# Patient Record
Sex: FEMALE | Race: WHITE | NOT HISPANIC OR LATINO
[De-identification: names, ages, dates, MRNs, and addresses within clinical notes are randomized per-mention and may not be internally consistent; named-entity substitution may affect disease eponyms.]

---
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
## 2020-03-13 ENCOUNTER — APPOINTMENT (OUTPATIENT)
Dept: PEDIATRICS | Facility: CLINIC | Age: 20
End: 2020-03-13
Payer: MEDICAID

## 2020-03-13 VITALS — WEIGHT: 167.5 LBS | TEMPERATURE: 96.8 F | DIASTOLIC BLOOD PRESSURE: 78 MMHG | SYSTOLIC BLOOD PRESSURE: 120 MMHG

## 2020-03-13 PROCEDURE — 99214 OFFICE O/P EST MOD 30 MIN: CPT

## 2020-03-13 NOTE — HISTORY OF PRESENT ILLNESS
[de-identified] : itchy red rash on both underarms for 1 month. white spots on both palms for 2 days. [FreeTextEntry6] : + rash under axilla X 1months; tried hydrocortisone cream which did not help, also now bumpy rash on hands, + itchy; uncertain what type of deodorant she is using; dove sensitive soap; aveeno lotion; no fevers; no other rashes no other new products\par meds: OCP

## 2020-03-13 NOTE — PHYSICAL EXAM
[NL] : regular rate and rhythm, normal S1, S2 audible, no murmurs [de-identified] : dry skin to hands, + papules to palms and medial fingers; + papules and dry atopic patches to axilla b/l

## 2020-03-13 NOTE — DISCUSSION/SUMMARY
[FreeTextEntry1] : D/W pt dyshidrotic eczema/ atopic dermatitis; reviewed advise lotions/soaps and detergents without scent or dye; apply Aquaphor or Eucerin head to toe after bath time; topical steroids as below to active patches only; monitor and call if further concern for recheck.\par f/u with dermatology in 2days as planned\par

## 2020-06-02 ENCOUNTER — APPOINTMENT (OUTPATIENT)
Dept: PEDIATRICS | Facility: CLINIC | Age: 20
End: 2020-06-02
Payer: MEDICAID

## 2020-06-02 VITALS — SYSTOLIC BLOOD PRESSURE: 110 MMHG | DIASTOLIC BLOOD PRESSURE: 78 MMHG

## 2020-06-02 VITALS
WEIGHT: 163.3 LBS | HEIGHT: 63.75 IN | BODY MASS INDEX: 28.22 KG/M2 | DIASTOLIC BLOOD PRESSURE: 82 MMHG | HEART RATE: 94 BPM | SYSTOLIC BLOOD PRESSURE: 128 MMHG

## 2020-06-02 DIAGNOSIS — R63.2 POLYPHAGIA: ICD-10-CM

## 2020-06-02 DIAGNOSIS — Z86.19 PERSONAL HISTORY OF OTHER INFECTIOUS AND PARASITIC DISEASES: ICD-10-CM

## 2020-06-02 DIAGNOSIS — Z87.01 PERSONAL HISTORY OF PNEUMONIA (RECURRENT): ICD-10-CM

## 2020-06-02 DIAGNOSIS — Z87.898 PERSONAL HISTORY OF OTHER SPECIFIED CONDITIONS: ICD-10-CM

## 2020-06-02 DIAGNOSIS — L23.9 ALLERGIC CONTACT DERMATITIS, UNSPECIFIED CAUSE: ICD-10-CM

## 2020-06-02 PROCEDURE — 99395 PREV VISIT EST AGE 18-39: CPT | Mod: 25

## 2020-06-02 PROCEDURE — 90471 IMMUNIZATION ADMIN: CPT

## 2020-06-02 PROCEDURE — 96127 BRIEF EMOTIONAL/BEHAV ASSMT: CPT

## 2020-06-02 PROCEDURE — 90651 9VHPV VACCINE 2/3 DOSE IM: CPT

## 2020-06-02 PROCEDURE — 96160 PT-FOCUSED HLTH RISK ASSMT: CPT | Mod: 59

## 2020-06-02 RX ORDER — LEVONORGESTREL AND ETHINYL ESTRADIOL 0.1-0.02MG
0.1-2 KIT ORAL DAILY
Qty: 90 | Refills: 0 | Status: DISCONTINUED | COMMUNITY
Start: 2019-07-24 | End: 2020-06-02

## 2020-06-02 NOTE — HISTORY OF PRESENT ILLNESS
[Up to date] : Up to date [Is permitted and is able to make independent decisions] : Is permitted and is able to make independent decisions [Has friends] : has friends [At least 1 hour of physical activity a day] : does not do at least 1 hour of physical activity a day [Sleep Concerns] : no sleep concerns [Uses tobacco] : does not use tobacco [Uses electronic nicotine delivery system] : does not use electronic nicotine delivery system [Uses drugs] : does not use drugs  [Drinks alcohol] : does not drink alcohol [Yes] : Patient has had sexual intercourse. [No] : No cigarette smoke exposure [FreeTextEntry8] : no menses due to OCP [FreeTextEntry7] : 19 year [facility] [de-identified] : sister [de-identified] : did well this year in college [de-identified] : under care of GYN [de-identified] : n eeds more activity/exercise [de-identified] : eats well  [de-identified] : patient has anxiety- feels like she  has OCD

## 2020-06-02 NOTE — RISK ASSESSMENT
[1] : 2) Feeling down, depressed, or hopeless for several days (1) [FreeTextEntry1] : patient acknowledges stress/ anxiety

## 2020-06-02 NOTE — PHYSICAL EXAM
[No Acute Distress] : no acute distress [Alert] : alert [Normocephalic] : normocephalic [Clear tympanic membranes with bony landmarks and light reflex present bilaterally] : clear tympanic membranes with bony landmarks and light reflex present bilaterally  [EOMI Bilateral] : EOMI bilateral [Nonerythematous Oropharynx] : nonerythematous oropharynx [Supple, full passive range of motion] : supple, full passive range of motion [Pink Nasal Mucosa] : pink nasal mucosa [Regular Rate and Rhythm] : regular rate and rhythm [Normal S1, S2 audible] : normal S1, S2 audible [No Palpable Masses] : no palpable masses [Clear to Auscultation Bilaterally] : clear to auscultation bilaterally [No Murmurs] : no murmurs [Soft] : soft [NonTender] : non tender [Non Distended] : non distended [No Splenomegaly] : no splenomegaly [Loe: _____] : Ole [unfilled] [No Hepatomegaly] : no hepatomegaly [Normal Muscle Tone] : normal muscle tone [No Gait Asymmetry] : no gait asymmetry [No Abnormal Lymph Nodes Palpated] : no abnormal lymph nodes palpated [No pain or deformities with palpation of bone, muscles, joints] : no pain or deformities with palpation of bone, muscles, joints [Straight] : straight [Cranial Nerves Grossly Intact] : cranial nerves grossly intact [No Rash or Lesions] : no rash or lesions

## 2020-06-12 ENCOUNTER — APPOINTMENT (OUTPATIENT)
Dept: OBGYN | Facility: CLINIC | Age: 20
End: 2020-06-12
Payer: MEDICAID

## 2020-06-12 VITALS
WEIGHT: 160 LBS | HEIGHT: 63.5 IN | SYSTOLIC BLOOD PRESSURE: 102 MMHG | DIASTOLIC BLOOD PRESSURE: 60 MMHG | BODY MASS INDEX: 28 KG/M2

## 2020-06-12 LAB
HCG UR QL: NEGATIVE
QUALITY CONTROL: YES

## 2020-06-12 PROCEDURE — 99213 OFFICE O/P EST LOW 20 MIN: CPT

## 2020-06-12 PROCEDURE — 81025 URINE PREGNANCY TEST: CPT

## 2020-06-12 NOTE — COUNSELING
[Breast Self Exam] : breast self exam [Vitamins/Supplements] : vitamins/supplements [Nutrition] : nutrition [Exercise] : exercise [Contraception] : contraception [Medication Management] : medication management [Weight Management] : weight management

## 2020-06-26 LAB
A VAGINAE DNA VAG QL NAA+PROBE: NORMAL
BVAB2 DNA VAG QL NAA+PROBE: NORMAL
C KRUSEI DNA VAG QL NAA+PROBE: NEGATIVE
C KRUSEI DNA VAG QL NAA+PROBE: POSITIVE
C TRACH RRNA SPEC QL NAA+PROBE: NEGATIVE
MEGA1 DNA VAG QL NAA+PROBE: NORMAL
N GONORRHOEA RRNA SPEC QL NAA+PROBE: NEGATIVE
T VAGINALIS RRNA SPEC QL NAA+PROBE: NEGATIVE

## 2020-07-24 ENCOUNTER — APPOINTMENT (OUTPATIENT)
Dept: OBGYN | Facility: CLINIC | Age: 20
End: 2020-07-24

## 2020-07-27 ENCOUNTER — APPOINTMENT (OUTPATIENT)
Dept: OBGYN | Facility: CLINIC | Age: 20
End: 2020-07-27
Payer: MEDICAID

## 2020-07-27 VITALS
SYSTOLIC BLOOD PRESSURE: 128 MMHG | BODY MASS INDEX: 29.36 KG/M2 | WEIGHT: 167.77 LBS | RESPIRATION RATE: 16 BRPM | HEIGHT: 63.5 IN | DIASTOLIC BLOOD PRESSURE: 80 MMHG

## 2020-07-27 PROCEDURE — 81025 URINE PREGNANCY TEST: CPT

## 2020-07-27 PROCEDURE — 99395 PREV VISIT EST AGE 18-39: CPT

## 2020-07-27 NOTE — COUNSELING
[Nutrition] : nutrition [Breast Self Exam] : breast self exam [STD (testing, results, tx)] : STD (testing, results, tx) [Exercise] : exercise [Vitamins/Supplements] : vitamins/supplements [Contraception] : contraception [Medication Management] : medication management [Weight Management] : weight management

## 2020-07-28 LAB
C TRACH RRNA SPEC QL NAA+PROBE: NOT DETECTED
N GONORRHOEA RRNA SPEC QL NAA+PROBE: NOT DETECTED
SOURCE TP AMPLIFICATION: NORMAL

## 2020-08-07 ENCOUNTER — APPOINTMENT (OUTPATIENT)
Dept: PEDIATRICS | Facility: CLINIC | Age: 20
End: 2020-08-07
Payer: MEDICAID

## 2020-08-07 VITALS — TEMPERATURE: 97.2 F | WEIGHT: 168.8 LBS

## 2020-08-07 LAB
BILIRUB UR QL STRIP: NEGATIVE
CLARITY UR: NORMAL
COLLECTION METHOD: NORMAL
GLUCOSE UR-MCNC: NORMAL
HCG UR QL: 0.2 EU/DL
HGB UR QL STRIP.AUTO: NORMAL
KETONES UR-MCNC: NEGATIVE
LEUKOCYTE ESTERASE UR QL STRIP: NORMAL
NITRITE UR QL STRIP: POSITIVE
PH UR STRIP: 5.5
PROT UR STRIP-MCNC: 30
SP GR UR STRIP: 1.03

## 2020-08-07 PROCEDURE — 99214 OFFICE O/P EST MOD 30 MIN: CPT | Mod: 25

## 2020-08-07 PROCEDURE — 81003 URINALYSIS AUTO W/O SCOPE: CPT | Mod: QW

## 2020-08-07 NOTE — DISCUSSION/SUMMARY
[FreeTextEntry1] : 19y F seen for dysuria and suprapubic pain x several days.\par UA + blood (menstruating), 1+ protein, positive for nitrites and trace leuk esterase.\par Will treat for UTI- Duricef 1gram PO QD x 10 days.\par Pt will drop off urine culture 4 days after starting antibiotics.\par Will call pt with Ucx results.\par

## 2020-08-07 NOTE — HISTORY OF PRESENT ILLNESS
[de-identified] : frequent urgency to urinate with little output, burning sensation while urinating x 2 days [FreeTextEntry6] : urinary frequency, burning at end of urination, and suprapubic pain x 2 days.\par Afebrile. Eating/drinking/energy level normal. \par Sexually active with one male partner. On OCPs. No condom use. \par Saw GYN two weeks ago - STI screenings were negative.\par

## 2020-08-11 ENCOUNTER — RESULT CHARGE (OUTPATIENT)
Age: 20
End: 2020-08-11

## 2020-08-11 LAB
BILIRUB UR QL STRIP: NORMAL
GLUCOSE UR-MCNC: NORMAL
HCG UR QL: 0.2 EU/DL
HGB UR QL STRIP.AUTO: NORMAL
KETONES UR-MCNC: NORMAL
LEUKOCYTE ESTERASE UR QL STRIP: NORMAL
NITRITE UR QL STRIP: NORMAL
PH UR STRIP: 6
PROT UR STRIP-MCNC: NORMAL
SP GR UR STRIP: 1.02

## 2020-08-20 ENCOUNTER — APPOINTMENT (OUTPATIENT)
Dept: PEDIATRICS | Facility: CLINIC | Age: 20
End: 2020-08-20
Payer: MEDICAID

## 2020-08-20 VITALS — WEIGHT: 171.6 LBS | TEMPERATURE: 97 F

## 2020-08-20 DIAGNOSIS — B97.11 COXSACKIEVIRUS AS THE CAUSE OF DISEASES CLASSIFIED ELSEWHERE: ICD-10-CM

## 2020-08-20 DIAGNOSIS — R39.9 UNSPECIFIED SYMPTOMS AND SIGNS INVOLVING THE GENITOURINARY SYSTEM: ICD-10-CM

## 2020-08-20 DIAGNOSIS — Z30.019 ENCOUNTER FOR INITIAL PRESCRIPTION OF CONTRACEPTIVES, UNSPECIFIED: ICD-10-CM

## 2020-08-20 DIAGNOSIS — B37.9 CANDIDIASIS, UNSPECIFIED: ICD-10-CM

## 2020-08-20 DIAGNOSIS — Z87.898 PERSONAL HISTORY OF OTHER SPECIFIED CONDITIONS: ICD-10-CM

## 2020-08-20 DIAGNOSIS — Z30.41 ENCOUNTER FOR SURVEILLANCE OF CONTRACEPTIVE PILLS: ICD-10-CM

## 2020-08-20 DIAGNOSIS — N76.0 ACUTE VAGINITIS: ICD-10-CM

## 2020-08-20 DIAGNOSIS — B96.89 ACUTE VAGINITIS: ICD-10-CM

## 2020-08-20 PROCEDURE — 99213 OFFICE O/P EST LOW 20 MIN: CPT

## 2020-08-20 RX ORDER — FLUCONAZOLE 150 MG/1
150 TABLET ORAL
Qty: 1 | Refills: 0 | Status: COMPLETED | COMMUNITY
Start: 2020-06-26 | End: 2020-08-20

## 2020-08-20 RX ORDER — NORETHINDRONE ACETATE AND ETHINYL ESTRADIOL 1; .02 MG/1; MG/1
1-20 TABLET ORAL DAILY
Qty: 21 | Refills: 0 | Status: COMPLETED | COMMUNITY
Start: 2019-10-15 | End: 2020-08-20

## 2020-08-20 RX ORDER — NORETHINDRONE ACETATE AND ETHINYL ESTRADIOL 1; 20 MG/1; UG/1
1-20 TABLET ORAL DAILY
Qty: 90 | Refills: 1 | Status: COMPLETED | COMMUNITY
Start: 2020-06-12 | End: 2020-08-20

## 2020-08-20 RX ORDER — NORETHINDRONE ACETATE AND ETHINYL ESTRADIOL 1; .02 MG/1; MG/1
1-20 TABLET ORAL
Refills: 0 | Status: COMPLETED | COMMUNITY
End: 2020-08-20

## 2020-08-20 RX ORDER — HYDROCORTISONE 25 MG/G
2.5 OINTMENT TOPICAL 4 TIMES DAILY
Qty: 30 | Refills: 2 | Status: ACTIVE | COMMUNITY
Start: 2020-02-03 | End: 1900-01-01

## 2020-08-20 RX ORDER — METRONIDAZOLE 7.5 MG/G
0.75 GEL VAGINAL
Qty: 1 | Refills: 0 | Status: COMPLETED | COMMUNITY
Start: 2019-07-25 | End: 2020-08-20

## 2020-08-20 NOTE — DISCUSSION/SUMMARY
[FreeTextEntry1] : - Discussed natural history and manifestations of coxsackie\par - Discussed supportive care\par - Maintain adequate hydration\par - Discussed infection control\par

## 2020-08-20 NOTE — PHYSICAL EXAM
[Erythematous Oropharynx] : erythematous oropharynx [NL] : normotonic [Erythematous] : erythematous [Macules] : macules [Papules] : papules [Trunk] : trunk [Arms] : arms [Hands] : hands [Legs] : legs [Feet] : feet [de-identified] : s

## 2020-08-20 NOTE — HISTORY OF PRESENT ILLNESS
[de-identified] : itchy rash all over body, started yesterday, no fevers, no changes in foods or soaps [FreeTextEntry6] : - Rash, started yesterday\par - Red itchy spots on arms and legs\par - Red spots on palms and soles, painful to walk\par - Using benadryl, hydrocortisone\par - No new exposures noted including foods, medications, soaps, lotions, detergents, cosmetics\par - No fever\par - No nasal congestion\par - No earache/ear tugging\par - No sore throat  \par - No cough\par - No wheezing or stridor\par - Normal appetite\par - No vomiting\par - Loose stools\par - No sick contacts\par - No recent travel\par

## 2020-08-22 RX ORDER — CEFADROXIL 1000 MG/1
1 TABLET ORAL DAILY
Qty: 10 | Refills: 0 | Status: COMPLETED | COMMUNITY
Start: 2020-08-07 | End: 2020-09-01

## 2020-12-30 ENCOUNTER — TRANSCRIPTION ENCOUNTER (OUTPATIENT)
Age: 20
End: 2020-12-30

## 2021-01-05 ENCOUNTER — TRANSCRIPTION ENCOUNTER (OUTPATIENT)
Age: 21
End: 2021-01-05

## 2021-01-20 ENCOUNTER — APPOINTMENT (OUTPATIENT)
Dept: OBGYN | Facility: CLINIC | Age: 21
End: 2021-01-20
Payer: MEDICAID

## 2021-01-20 VITALS
BODY MASS INDEX: 28.87 KG/M2 | HEART RATE: 72 BPM | SYSTOLIC BLOOD PRESSURE: 116 MMHG | RESPIRATION RATE: 16 BRPM | WEIGHT: 165 LBS | HEIGHT: 63.5 IN | DIASTOLIC BLOOD PRESSURE: 70 MMHG

## 2021-01-20 LAB
HCG UR QL: NEGATIVE
QUALITY CONTROL: YES

## 2021-01-20 PROCEDURE — 99213 OFFICE O/P EST LOW 20 MIN: CPT

## 2021-01-20 PROCEDURE — 81025 URINE PREGNANCY TEST: CPT

## 2021-01-20 PROCEDURE — 99072 ADDL SUPL MATRL&STAF TM PHE: CPT

## 2021-01-20 RX ORDER — CLOTRIMAZOLE 10 MG/G
1 CREAM TOPICAL TWICE DAILY
Qty: 2 | Refills: 0 | Status: ACTIVE | COMMUNITY
Start: 2021-01-20 | End: 1900-01-01

## 2021-01-20 NOTE — DISCUSSION/SUMMARY
[FreeTextEntry1] : 19 YO PRESENTS FOR OCP RENEWAL. NO COMPLAINTS OR CONCERNS AT THIS TIME. \par UCG- NEGATIVE \par \par CBE WNL, SBE TEACHING CONDUCTED WITH RETURN DEMONSTRATION\par PE EXAM REVEALS A SEBACEOUS CYST ALONG WITH VULVAR IRRITATION \par PT ADVISED OF WARM COMPRESS TO AREA FOR SEBACEOUS CYST ALONG WITH CREAM FOR VULVAR ITCHING \par \par PATIENT DECLINES STD TESTING AT THIS TIME \par PATIENT DENIES HX OF MIGRAINES WITH AURAS, PE, DVT OR CIGARETTE SMOKING\par \par ACHES DISCUSSED IN GREAT DETAIL, PATIENT STATES UNDERSTANDING.\par OCP JUNEL RX SENT TO PHARMACY WITH INSTRUCTIONS ON USE \par RX CREAM SENT TO PHARMACY FOR VULVAR ITCHING WITH INSTRUCTIONS ON USE \par ALL QUESTIONS ANSWERED TO PATIENT SATISFACTION \par \par RTO IN 6 MOS FOR ANNUAL OR PRN \par

## 2021-01-20 NOTE — PHYSICAL EXAM
[Alert] : alert [No Acute Distress] : no acute distress [No Murmurs] : no murmurs [Oriented x3] : oriented x3 [Examination Of The Breasts] : a normal appearance [No Masses] : no breast masses were palpable [Labia Majora] : normal [Labia Minora] : normal [Normal] : normal [Uterine Adnexae] : normal

## 2021-01-20 NOTE — COUNSELING
[Nutrition/ Exercise/ Weight Management] : nutrition, exercise, weight management [Body Image] : body image [Breast Self Exam] : breast self exam [Contraception/ Emergency Contraception/ Safe Sexual Practices] : contraception, emergency contraception, safe sexual practices [Medication Management] : medication management

## 2021-02-16 ENCOUNTER — NON-APPOINTMENT (OUTPATIENT)
Age: 21
End: 2021-02-16

## 2021-02-26 ENCOUNTER — TRANSCRIPTION ENCOUNTER (OUTPATIENT)
Age: 21
End: 2021-02-26

## 2021-03-01 ENCOUNTER — TRANSCRIPTION ENCOUNTER (OUTPATIENT)
Age: 21
End: 2021-03-01

## 2021-03-02 ENCOUNTER — NON-APPOINTMENT (OUTPATIENT)
Age: 21
End: 2021-03-02

## 2021-03-04 ENCOUNTER — TRANSCRIPTION ENCOUNTER (OUTPATIENT)
Age: 21
End: 2021-03-04

## 2021-03-10 ENCOUNTER — TRANSCRIPTION ENCOUNTER (OUTPATIENT)
Age: 21
End: 2021-03-10

## 2021-05-09 ENCOUNTER — TRANSCRIPTION ENCOUNTER (OUTPATIENT)
Age: 21
End: 2021-05-09

## 2021-05-13 ENCOUNTER — RX RENEWAL (OUTPATIENT)
Age: 21
End: 2021-05-13

## 2021-05-24 DIAGNOSIS — Z23 ENCOUNTER FOR IMMUNIZATION: ICD-10-CM

## 2021-06-07 ENCOUNTER — APPOINTMENT (OUTPATIENT)
Dept: PEDIATRICS | Facility: CLINIC | Age: 21
End: 2021-06-07
Payer: MEDICAID

## 2021-06-07 VITALS
HEIGHT: 64 IN | SYSTOLIC BLOOD PRESSURE: 116 MMHG | DIASTOLIC BLOOD PRESSURE: 72 MMHG | BODY MASS INDEX: 28 KG/M2 | WEIGHT: 164 LBS

## 2021-06-07 DIAGNOSIS — M54.2 CERVICALGIA: ICD-10-CM

## 2021-06-07 DIAGNOSIS — L70.8 OTHER ACNE: ICD-10-CM

## 2021-06-07 DIAGNOSIS — L29.2 PRURITUS VULVAE: ICD-10-CM

## 2021-06-07 DIAGNOSIS — Z87.2 PERSONAL HISTORY OF DISEASES OF THE SKIN AND SUBCUTANEOUS TISSUE: ICD-10-CM

## 2021-06-07 DIAGNOSIS — R45.86 EMOTIONAL LABILITY: ICD-10-CM

## 2021-06-07 PROCEDURE — 90620 MENB-4C VACCINE IM: CPT

## 2021-06-07 PROCEDURE — 99173 VISUAL ACUITY SCREEN: CPT | Mod: 59

## 2021-06-07 PROCEDURE — 90472 IMMUNIZATION ADMIN EACH ADD: CPT

## 2021-06-07 PROCEDURE — 99395 PREV VISIT EST AGE 18-39: CPT | Mod: 25

## 2021-06-07 PROCEDURE — 90715 TDAP VACCINE 7 YRS/> IM: CPT

## 2021-06-07 PROCEDURE — 90471 IMMUNIZATION ADMIN: CPT

## 2021-06-07 PROCEDURE — 96160 PT-FOCUSED HLTH RISK ASSMT: CPT | Mod: 59

## 2021-06-07 PROCEDURE — 90651 9VHPV VACCINE 2/3 DOSE IM: CPT

## 2021-06-07 NOTE — HISTORY OF PRESENT ILLNESS
[Mother] : mother [Needs Immunizations] : needs immunizations [Normal] : normal [Eats meals with family] : eats meals with family [Sleep Concerns] : no sleep concerns [Normal Performance] : normal performance [Eats regular meals including adequate fruits and vegetables] : eats regular meals including adequate fruits and vegetables [Drinks non-sweetened liquids] : drinks non-sweetened liquids  [Calcium source] : calcium source [At least 1 hour of physical activity a day] : at least 1 hour of physical activity a day [Uses electronic nicotine delivery system] : does not use electronic nicotine delivery system [Uses tobacco] : does not use tobacco [Uses drugs] : does not use drugs  [Drinks alcohol] : does not drink alcohol [No] : No cigarette smoke exposure [Yes] : Patient has had sexual intercourse. [Has problems with sleep] : does not have problems with sleep [Gets depressed, anxious, or irritable/has mood swings] : does not get depressed, anxious, or irritable/has mood swings [Has thought about hurting self or considered suicide] : has not thought about hurting self or considered suicide [FreeTextEntry7] : 20 yr [facility] [de-identified] : neck pain [FreeTextEntry8] : on OCP  [de-identified] : did well in college [de-identified] : eating better- watching weight [de-identified] : under care of GYN [FreeTextEntry1] : parent/patient denies- night sweats, night pains,  unexplained weight loss, headache, chest pain, SOB, loss of energy, chronic joint pains\par patient has normal urine output and stooling\par c/o pain upper spine over vertebrae 2-3

## 2021-06-07 NOTE — DISCUSSION/SUMMARY
[] : The components of the vaccine(s) to be administered today are listed in the plan of care. The disease(s) for which the vaccine(s) are intended to prevent and the risks have been discussed with the caretaker.  The risks are also included in the appropriate vaccination information statements which have been provided to the patient's caregiver.  The caregiver has given consent to vaccinate. [FreeTextEntry1] : Continue and/or try to have a balanced diet with all food groups. Brush teeth twice a day with toothbrush. Recommend visit to dentist. Maintain consistent daily routines and sleep schedule. Risky behaviors assessed. School discussed. Try to limit screen time to no more than 2 hours per day. Encourage physical activity.\par Return 1 year for routine well child check.\par coordination of care reviewed\par 5-2-1-0 reviewed\par cardiac checklist -reviewed\par CRAFFT screening was reviewed and discussed as needed\par will still need varivax #2 -return 4 weeks for bexsero #2

## 2021-06-07 NOTE — PHYSICAL EXAM
[Alert] : alert [No Acute Distress] : no acute distress [Normocephalic] : normocephalic [EOMI Bilateral] : EOMI bilateral [Clear tympanic membranes with bony landmarks and light reflex present bilaterally] : clear tympanic membranes with bony landmarks and light reflex present bilaterally  [Pink Nasal Mucosa] : pink nasal mucosa [Nonerythematous Oropharynx] : nonerythematous oropharynx [Supple, full passive range of motion] : supple, full passive range of motion [No Palpable Masses] : no palpable masses [Clear to Auscultation Bilaterally] : clear to auscultation bilaterally [Regular Rate and Rhythm] : regular rate and rhythm [Normal S1, S2 audible] : normal S1, S2 audible [No Murmurs] : no murmurs [Soft] : soft [NonTender] : non tender [Non Distended] : non distended [No Hepatomegaly] : no hepatomegaly [No Splenomegaly] : no splenomegaly [Ole: _____] : Ole [unfilled] [No Abnormal Lymph Nodes Palpated] : no abnormal lymph nodes palpated [Normal Muscle Tone] : normal muscle tone [No Gait Asymmetry] : no gait asymmetry [No pain or deformities with palpation of bone, muscles, joints] : no pain or deformities with palpation of bone, muscles, joints [Straight] : straight [Cranial Nerves Grossly Intact] : cranial nerves grossly intact [de-identified] : upper cervical pain - +FROM [de-identified] : mild acne on the face

## 2021-06-08 PROBLEM — M54.2 CERVICAL SPINE PAIN: Status: ACTIVE | Noted: 2021-06-08

## 2021-06-22 ENCOUNTER — APPOINTMENT (OUTPATIENT)
Dept: OBGYN | Facility: CLINIC | Age: 21
End: 2021-06-22
Payer: MEDICAID

## 2021-06-22 VITALS
HEIGHT: 64 IN | WEIGHT: 164 LBS | DIASTOLIC BLOOD PRESSURE: 60 MMHG | BODY MASS INDEX: 28 KG/M2 | SYSTOLIC BLOOD PRESSURE: 110 MMHG

## 2021-06-22 LAB
HCG UR QL: NEGATIVE
QUALITY CONTROL: YES

## 2021-06-22 PROCEDURE — 81025 URINE PREGNANCY TEST: CPT

## 2021-06-22 PROCEDURE — 99213 OFFICE O/P EST LOW 20 MIN: CPT

## 2021-06-22 NOTE — DISCUSSION/SUMMARY
[FreeTextEntry1] : 21 YO PRESENTS FOR OCP RENEWAL. NO COMPLAINTS OR CONCERNS AT THIS TIME. \par UCG- NEGATIVE \par \par CBE WNL, SBE TEACHING CONDUCTED WITH RETURN DEMONSTRATION\par \par PATIENT DECLINES STD TESTING AT THIS TIME \par PATIENT DENIES HX OF MIGRAINES WITH AURAS, PE, DVT OR CIGARETTE SMOKING\par \par ACHES DISCUSSED IN GREAT DETAIL, PATIENT STATES UNDERSTANDING.\par OCP RX SENT TO PHARMACY WITH INSTRUCTIONS ON USE \par ALL QUESTIONS ANSWERED TO PATIENT SATISFACTION \par \par RTO IN 6 WEEKS FOR ANNUAL OR PRN \par \par

## 2021-06-22 NOTE — COUNSELING
[Nutrition/ Exercise/ Weight Management] : nutrition, exercise, weight management [Body Image] : body image [Vitamins/Supplements] : vitamins/supplements [Breast Self Exam] : breast self exam [Contraception/ Emergency Contraception/ Safe Sexual Practices] : contraception, emergency contraception, safe sexual practices [Medication Management] : medication management

## 2021-08-06 ENCOUNTER — APPOINTMENT (OUTPATIENT)
Dept: OBGYN | Facility: CLINIC | Age: 21
End: 2021-08-06
Payer: MEDICAID

## 2021-08-06 VITALS
RESPIRATION RATE: 16 BRPM | BODY MASS INDEX: 28.88 KG/M2 | WEIGHT: 163 LBS | HEIGHT: 63 IN | DIASTOLIC BLOOD PRESSURE: 70 MMHG | SYSTOLIC BLOOD PRESSURE: 124 MMHG

## 2021-08-06 PROCEDURE — 99395 PREV VISIT EST AGE 18-39: CPT

## 2021-08-06 PROCEDURE — 81025 URINE PREGNANCY TEST: CPT

## 2021-08-06 NOTE — HISTORY OF PRESENT ILLNESS
[FreeTextEntry1] : Patient is a 69-year-old male who has an extensive prior cardiovascular history including multiple stents placed in 2009 followed by a myocardial infarction in 2012 with  in-stent restenosis. He uses both diabetic and hypertensive which has been difficult to treat. He is a long-standing smoker and currently smokes a half a pack per day. Patient presents now with ongoing symptoms of chest discomfort which is diffuse in nature with no radiation and no associated shortness of breath. He did not use nitroglycerin on a regular basis. Pain is somewhat dissimilar to his pain when he had a myocardial infarction and when he had angina in 2009.\par 10/23/2020\par Seen by Dr Vigil in [facility] for BP control \par Still mild smoker \par Has been intolrant to NTG in past with syncope related to drop in BP \par Prior ETT MIBI was non diagnostic for CAD despite cath finging mod -severe disease\par some pain relived with PPI \par Has had recent periods of chest discomfort lasting days at a time no clear effort based symptoms or ANNE \par 
[FreeTextEntry1] : PT PRESENTS FOR ANNUAL EXAM

## 2021-08-06 NOTE — COUNSELING
[Nutrition/ Exercise/ Weight Management] : nutrition, exercise, weight management [Body Image] : body image [Breast Self Exam] : breast self exam [Contraception/ Emergency Contraception/ Safe Sexual Practices] : contraception, emergency contraception, safe sexual practices [STD (testing, results, tx)] : STD (testing, results, tx) [Lab Results] : lab results

## 2021-08-06 NOTE — DISCUSSION/SUMMARY
[FreeTextEntry1] : 21 YO PRESENTS FOR [employer]. NO COMPLAINTS OR CONCERNS AT THIS TIME. \par UCG- NEGATIVE \par \par CBE WNL, SBE TEACHING CONDUCTED WITH RETURN DEMONSTRATION\par \par PAP SMEAR COLLECTED WITHOUT COMPLICATION- STD TESTING DESIRED \par BIMANUAL EXAM COMPLETED, WNL \par \par CONTRACEPTION AND ACHES DISCUSSED IN GREAT DETAIL\par PATIENT DECIDES ON OCP AT THIS TIME \par ALL QUESTIONS ANSWERED TO PATIENT SATISFACTION \par \par RTO IN 6 MOS FOR OCP REFILL OR PRN \par \par

## 2021-08-09 LAB
C TRACH RRNA SPEC QL NAA+PROBE: NOT DETECTED
HCG UR QL: NEGATIVE
N GONORRHOEA RRNA SPEC QL NAA+PROBE: NOT DETECTED
QUALITY CONTROL: YES
SOURCE TP AMPLIFICATION: NORMAL

## 2021-08-16 ENCOUNTER — NON-APPOINTMENT (OUTPATIENT)
Age: 21
End: 2021-08-16

## 2021-08-19 ENCOUNTER — TRANSCRIPTION ENCOUNTER (OUTPATIENT)
Age: 21
End: 2021-08-19

## 2021-11-12 ENCOUNTER — NON-APPOINTMENT (OUTPATIENT)
Age: 21
End: 2021-11-12

## 2021-11-12 RX ORDER — MOMETASONE 50 UG/1
50 SPRAY, METERED NASAL DAILY
Qty: 1 | Refills: 2 | Status: DISCONTINUED | COMMUNITY
Start: 2021-11-12 | End: 2021-11-12

## 2021-11-16 RX ORDER — FLUTICASONE PROPIONATE 50 UG/1
50 SPRAY, METERED NASAL
Qty: 16 | Refills: 0 | Status: ACTIVE | COMMUNITY
Start: 2019-08-20 | End: 1900-01-01

## 2021-11-23 ENCOUNTER — RX RENEWAL (OUTPATIENT)
Age: 21
End: 2021-11-23

## 2021-12-09 ENCOUNTER — APPOINTMENT (OUTPATIENT)
Dept: PEDIATRICS | Facility: CLINIC | Age: 21
End: 2021-12-09
Payer: MEDICAID

## 2021-12-09 VITALS — WEIGHT: 171.3 LBS | TEMPERATURE: 96.9 F

## 2021-12-09 DIAGNOSIS — J06.9 ACUTE UPPER RESPIRATORY INFECTION, UNSPECIFIED: ICD-10-CM

## 2021-12-09 PROCEDURE — 99214 OFFICE O/P EST MOD 30 MIN: CPT

## 2021-12-09 NOTE — DISCUSSION/SUMMARY
[FreeTextEntry1] : - Script given for knee xray, will call with results.\par - Rest\par - NSAIDs\par - If not improved in 7-10 days would recommend PT\par - Symptoms likely due to viral URI. Recommend supportive care. Return if symptoms worsen or persist.\par

## 2021-12-09 NOTE — PHYSICAL EXAM
[Inflamed Nasal Mucosa] : inflamed nasal mucosa [Erythematous Oropharynx] : erythematous oropharynx [Enlarged] : enlarged [Anterior Cervical] : anterior cervical [NL] : warm [de-identified] : BL knees no swelling, FROM, no TTP

## 2021-12-09 NOTE — HISTORY OF PRESENT ILLNESS
[de-identified] : as per pt knee pain and slight cold X couple weeks  [FreeTextEntry6] : - R knee pain x1 month\par - Only bothers her with activity, usually about 1x/week, or with stairs\par - Occasionally L knee pain\par - No known injury\par - States when she used to play lacrosse sometimes she would have R knee pain but never injured it per se\par - Nasal congestion x3 weeks\par - Cough\par - No wheezing or stridor\par - No fever\par - No earache/ear tugging\par - No sore throat  but scratchy\par - Normal appetite\par - No vomiting\par - No diarrhea\par - Sick contacts - sister with URI, no known COVID exposure\par - Gets COVID tested weekly for school and has been negative

## 2021-12-09 NOTE — REVIEW OF SYSTEMS
[Headache] : no headache [Eye Discharge] : no eye discharge [Eye Redness] : no eye redness [Ear Pain] : no ear pain [Nasal Discharge] : nasal discharge [Nasal Congestion] : nasal congestion [Sore Throat] : no sore throat [Tachypnea] : not tachypneic [Wheezing] : no wheezing [Cough] : cough [Restriction of Motion] : no restriction of motion [Bone Deformity] : no bone deformity [Swelling of Joint] : no swelling of joint [Redness of Joint] : no redness of joint [Changes in Gait] : no changes in gait [Knee Pain] : knee pain [Negative] : Genitourinary

## 2021-12-13 ENCOUNTER — NON-APPOINTMENT (OUTPATIENT)
Age: 21
End: 2021-12-13

## 2022-01-17 ENCOUNTER — APPOINTMENT (OUTPATIENT)
Age: 22
End: 2022-01-17
Payer: MEDICAID

## 2022-01-17 VITALS
SYSTOLIC BLOOD PRESSURE: 121 MMHG | HEART RATE: 73 BPM | HEIGHT: 63 IN | BODY MASS INDEX: 30.12 KG/M2 | WEIGHT: 170 LBS | DIASTOLIC BLOOD PRESSURE: 84 MMHG

## 2022-01-17 DIAGNOSIS — M25.561 PAIN IN RIGHT KNEE: ICD-10-CM

## 2022-01-17 DIAGNOSIS — M23.91 UNSPECIFIED INTERNAL DERANGEMENT OF RIGHT KNEE: ICD-10-CM

## 2022-01-17 PROCEDURE — 99204 OFFICE O/P NEW MOD 45 MIN: CPT

## 2022-01-17 NOTE — HISTORY OF PRESENT ILLNESS
[Stable] : stable [___ yrs] : [unfilled] year(s) ago [0] : a current pain level of 0/10 [3] : an average pain level of 3/10 [Running] : worsened by running [Knee Flexion] : worsened with knee flexion [NSAIDs] : relieved by nonsteroidal anti-inflammatory drugs [Rest] : relieved by rest [de-identified] : VERENICE GARCIA is a 21 year female being seen for initial visit R knee pain. She reports intermittent pain since past few years with no specific GARCIA. She localizes most of her pain over superoanterior knee joint, and over quadriceps tendon. She reports most pain while bending, lunging, leg press. She endorses occasional mechanical symptoms at this time, such as buckling and instability of knee joint. She denies numbness or tingling down to extremity. She saw her PCP who ordered xrays. She comes in with Xray from .

## 2022-01-17 NOTE — DISCUSSION/SUMMARY
[de-identified] : VERENICE GARCIA is a 21 year female being seen for initial visit R knee pain. She reports intermittent pain since past few years with no specific GARCIA. She localizes most of her pain over superoanterior knee joint, and over quadriceps tendon. She reports most pain while bending, lunging, leg press. She endorses occasional mechanical symptoms at this time, such as buckling and instability of knee joint. She denies numbness or tingling down to extremity. She saw her PCP who ordered xrays. She comes in with Xray from .  \par \par We had a thorough discussion regarding the nature of her pain, the pathophysiology, as well as all treatment options. Considering the patient's current presentation of pain, physical exam, and radiographs an MRI is indicated at this time. A prescription for this was given to the patient. We will go over the MRI results with her upon obtaining the results in the office and advise her of further treatment options. She is going back to school in [facility] on Jan 30, and I advised her to obtain MRI appointment [facility]. She agrees with the above plan and all questions were answered.

## 2022-01-17 NOTE — ADDENDUM
[FreeTextEntry1] : Documented by Aneudy Mcmillan acting as a scribe for Dr. Bhat on 01/17/2022. \par \par All medical record entries made by the Scribe were at my, Dr. Bhat's, direction and\par personally dictated by me on 01/17/2022. I have reviewed the chart and agree that the record\par accurately reflects my personal performance of the history, physical exam, procedure and imaging.

## 2022-01-17 NOTE — PHYSICAL EXAM
[de-identified] : Physical Exam:\par General: Well appearing, no acute distress\par Neurologic: A&Ox3, No focal deficits\par Head: NCAT without abrasions, lacerations, or ecchymosis to head, face, or scalp\par Eyes: No scleral icterus, no gross abnormalities\par Respiratory: Equal chest wall expansion bilaterally, no accessory muscle use\par Lymphatic: No lymphadenopathy palpated\par Skin: Warm and dry\par Psychiatric: Normal mood and affect \par \par Right Knee: Range of Motion in Degrees	\par 	  Claimant:	Normal:	\par Flexion Active	 135 	 135-degrees	\par Flexion Passive	 135	 135-degrees	\par Extension Active	 0-5	 0-5-degrees	\par Extension Passive	 0-5	 0-5-degrees	\par \par Medial JL TTP. No weakness to flexion/extension. No evidence of instability in the AP plane or varus or valgus stress. Negative Lachman. Negative pivot shift. Negative anterior drawer test. Negative posterior drawer test. Negative Brigido. Negative Apley grind. negative Isaac test. No lateral joint line tenderness. No tenderness over the medial and lateral facet of the patella. No patellofemoral crepitations. No lateral tilting patella. No patellar apprehension. No crepitation in the medial and lateral femoral condyle. No proximal or distal swelling, edema or tenderness. No gross motor or sensory deficits. No intra-articular swelling. 2+ DP and PT pulses. No varus or valgus malalignment. Skin is intact. No rashes, scars or lesions.\par \par Left Knee: Range of Motion in Degrees	\par 	  Claimant:	Normal:	\par Flexion Active	 135 	 135-degrees	\par Flexion Passive	 135	 135-degrees	\par Extension Active	 0-5	 0-5-degrees	\par Extension Passive	 0-5	 0-5-degrees	\par \par No weakness to flexion/extension. No evidence of instability in the AP plane or varus or valgus stress. Negative Lachman. Negative pivot shift. Negative anterior drawer test. Negative posterior drawer test. Negative Brigido. Negative Apley grind. Negative Isaac test. No medial or lateral joint line tenderness. No tenderness over the medial and lateral facet of the patella. No patellofemoral crepitations. No lateral tilting patella. No patellar apprehension. No crepitation in the medial and lateral femoral condyle. No proximal or distal swelling, edema or tenderness. No gross motor or sensory deficits. No intra-articular swelling. 2+ DP and PT pulses. No varus or valgus malalignment. Skin is intact. No rashes, scars or lesions. \par \par Right Lower Extremity \par o Foot:\par ¦ Inspection/Palpation : no tenderness to palpation, no swelling. Slight plano valgus noted BILAT. \par ¦ Range of Motion : arc of motion full and painless in all planes\par ¦ Stability : no joint instability on provocative testing\par ¦ Strength : all muscles 5/5\par o Skin : no erythema or ecchymosis present \par o Sensation : sensation to light touch intact\par o Vascular Exam : no edema, no cyanosis, dorsalis pedis artery pulse 2+, posterior tibial artery pulse 2+ \par \par Left Lower Extremity \par o Foot:\par ¦ Inspection/Palpation : no tenderness to palpation, no swelling\par ¦ Range of Motion : arc of motion full and painless in all planes\par ¦ Stability : no joint instability on provocative testing\par ¦ Strength : all muscles 5/5\par o Skin : no erythema or ecchymosis present\par o Sensation : sensation to light touch intact\par o Vascular Exam : no edema, no cyanosis, dorsalis pedis artery pulse 2+, posterior tibial artery pulse 2+ \par \par \par On inspection of the Right hip shows no gross abnormalities. No loss muscle volume. Skin appears normal. When asked to point at the most painful part of their hip, they point over their SI joint. PSIS TTP.\par Negative Heel strike, negative log roll. \par Normal ROM. No pain with ROM. Hip flexor strength is 4-5 because of pain.  Anterior hip impingement signs are NEG. No evidence of posterior impingement. \par Resisted straight leg raise does not produce groin pain. No signs of the iliopsoas tendinitis. GABE Test (Terry's Test): Negative; FADIR Test (Labral Tear/BERNARD): Negative\par Audible clicking/ snap with flexion and adduction. No palpable clicking. \par On lateral decubitus examination there is no focal area of her greater trochanteric tenderness. Abductor strength is 5 out of 5.\par \par Motor strength is intact distally, 5/5 over quads,hamstring, EHL, FHL, GSC, TA.\par Sensation intact over L1-S1 dermatomes  [de-identified] : DATE OF EXAM: 12/10/2021\par RIGHT KNEE XRAY AP LATERAL AND OBLIQUE 3 VIEWS\par \par IMPRESSION:\par Normal right knee.

## 2022-01-27 ENCOUNTER — APPOINTMENT (OUTPATIENT)
Dept: ORTHOPEDIC SURGERY | Facility: CLINIC | Age: 22
End: 2022-01-27

## 2022-01-27 RX ORDER — MELOXICAM 7.5 MG/1
7.5 TABLET ORAL DAILY
Qty: 21 | Refills: 2 | Status: COMPLETED | COMMUNITY
Start: 2022-01-27 | End: 2022-03-31

## 2022-03-18 ENCOUNTER — APPOINTMENT (OUTPATIENT)
Dept: OBGYN | Facility: CLINIC | Age: 22
End: 2022-03-18
Payer: MEDICAID

## 2022-03-18 VITALS
HEIGHT: 63 IN | BODY MASS INDEX: 31.01 KG/M2 | DIASTOLIC BLOOD PRESSURE: 80 MMHG | TEMPERATURE: 97.7 F | WEIGHT: 175 LBS | SYSTOLIC BLOOD PRESSURE: 110 MMHG

## 2022-03-18 LAB
HCG UR QL: NEGATIVE
QUALITY CONTROL: YES

## 2022-03-18 PROCEDURE — 81025 URINE PREGNANCY TEST: CPT

## 2022-03-18 PROCEDURE — 99213 OFFICE O/P EST LOW 20 MIN: CPT

## 2022-03-18 NOTE — DISCUSSION/SUMMARY
[FreeTextEntry1] : 20 YO PRESENTS FOR OCP RENEWAL. NO COMPLAINTS OR CONCERNS AT THIS TIME. \par UCG- NEGATIVE \par \par CBE WNL, SBE TEACHING CONDUCTED WITH RETURN DEMONSTRATION\par \par PATIENT DECLINES STD TESTING AT THIS TIME \par PATIENT DENIES HX OF MIGRAINES WITH AURAS, PE, DVT OR CIGARETTE SMOKING\par \par ACHES DISCUSSED IN GREAT DETAIL, PATIENT STATES UNDERSTANDING.\par OCP JUNEL RX SENT TO PHARMACY WITH INSTRUCTIONS ON USE \par ALL QUESTIONS ANSWERED TO PATIENT SATISFACTION \par \par RTO IN 6 MOS FOR ANNUAL OR PRN \par \par

## 2022-04-19 ENCOUNTER — RX RENEWAL (OUTPATIENT)
Age: 22
End: 2022-04-19

## 2022-04-19 RX ORDER — CLINDAMYCIN PHOSPHATE 10 MG/ML
1 SOLUTION TOPICAL
Qty: 60 | Refills: 2 | Status: ACTIVE | COMMUNITY
Start: 2021-02-15 | End: 1900-01-01

## 2022-04-25 DIAGNOSIS — J98.01 ACUTE BRONCHOSPASM: ICD-10-CM

## 2022-04-25 RX ORDER — ALBUTEROL SULFATE 90 UG/1
108 (90 BASE) AEROSOL, METERED RESPIRATORY (INHALATION) EVERY 4 HOURS
Qty: 1 | Refills: 2 | Status: ACTIVE | COMMUNITY
Start: 2020-02-03 | End: 1900-01-01

## 2022-05-11 ENCOUNTER — APPOINTMENT (OUTPATIENT)
Dept: ORTHOPEDIC SURGERY | Facility: CLINIC | Age: 22
End: 2022-05-11
Payer: MEDICAID

## 2022-05-11 VITALS
OXYGEN SATURATION: 99 % | BODY MASS INDEX: 27.11 KG/M2 | HEART RATE: 62 BPM | SYSTOLIC BLOOD PRESSURE: 121 MMHG | DIASTOLIC BLOOD PRESSURE: 77 MMHG | HEIGHT: 63 IN | WEIGHT: 153 LBS

## 2022-05-11 DIAGNOSIS — M25.869 OTHER SPECIFIED JOINT DISORDERS, UNSPECIFIED KNEE: ICD-10-CM

## 2022-05-11 PROCEDURE — 99213 OFFICE O/P EST LOW 20 MIN: CPT

## 2022-05-11 NOTE — PHYSICAL EXAM
[de-identified] : Physical Exam:\par General: Well appearing, no acute distress\par Neurologic: A&Ox3, No focal deficits\par Head: NCAT without abrasions, lacerations, or ecchymosis to head, face, or scalp\par Eyes: No scleral icterus, no gross abnormalities\par Respiratory: Equal chest wall expansion bilaterally, no accessory muscle use\par Lymphatic: No lymphadenopathy palpated\par Skin: Warm and dry\par Psychiatric: Normal mood and affect \par \par Right Knee: Range of Motion in Degrees	\par 	  Claimant:	Normal:	\par Flexion Active	 135 	 135-degrees	\par Flexion Passive	 135	 135-degrees	\par Extension Active	 0-5	 0-5-degrees	\par Extension Passive	 0-5	 0-5-degrees	\par \par Medial JL - TTP. No weakness to flexion/extension. No evidence of instability in the AP plane or varus or valgus stress. Negative Lachman. Negative pivot shift. Negative anterior drawer test. Negative posterior drawer test. Negative Brigido. Negative Apley grind. negative Isaac test. No lateral joint line tenderness. + tenderness over the medial and lateral facet of the patella. + patellofemoral crepitations. No lateral tilting patella. No patellar apprehension. No crepitation in the medial and lateral femoral condyle. No proximal or distal swelling, edema or tenderness. No gross motor or sensory deficits. No intra-articular swelling. 2+ DP and PT pulses. No varus or valgus malalignment. Skin is intact. No rashes, scars or lesions.\par \par

## 2022-05-11 NOTE — HISTORY OF PRESENT ILLNESS
[Stable] : stable [___ yrs] : [unfilled] year(s) ago [0] : a current pain level of 0/10 [3] : an average pain level of 3/10 [Running] : worsened by running [Knee Flexion] : worsened with knee flexion [NSAIDs] : relieved by nonsteroidal anti-inflammatory drugs [Rest] : relieved by rest [de-identified] : VERENICE GARCIA is a 21 year female being seen for f/u visit R knee pain.  She reports recently graduating from [employer] with a business degree.  She attended physical therapy at [employer] but did not find them to be helpful.  She states physical therapy here at home has been more helpful and she would like to return to therapy.  She has had continued intermittent pain around the kneecap.  Is worse with transcending stairs.  She has a positive theater sign.  She has difficulty with squatting and kneeling.  She denies instability or buckling.

## 2022-05-11 NOTE — DISCUSSION/SUMMARY
[de-identified] : I had a lengthy discussion with the patient regarding their current condition. We discussed the treatment options including operative and nonoperative management. At this time I recommended conservative management.  She is referred to physical therapy.  A home exercise program was recommended as well.  She can perform it in the gym.  She will follow-up in 2 months or as needed.  All questions were answered.

## 2022-05-17 ENCOUNTER — NON-APPOINTMENT (OUTPATIENT)
Age: 22
End: 2022-05-17

## 2022-06-23 ENCOUNTER — APPOINTMENT (OUTPATIENT)
Dept: PEDIATRICS | Facility: CLINIC | Age: 22
End: 2022-06-23
Payer: MEDICAID

## 2022-06-23 VITALS
BODY MASS INDEX: 26.39 KG/M2 | HEART RATE: 83 BPM | OXYGEN SATURATION: 99 % | HEIGHT: 63.75 IN | WEIGHT: 152.7 LBS | SYSTOLIC BLOOD PRESSURE: 118 MMHG | DIASTOLIC BLOOD PRESSURE: 70 MMHG

## 2022-06-23 DIAGNOSIS — Z00.00 ENCOUNTER FOR GENERAL ADULT MEDICAL EXAMINATION W/OUT ABNORMAL FINDINGS: ICD-10-CM

## 2022-06-23 DIAGNOSIS — N39.0 URINARY TRACT INFECTION, SITE NOT SPECIFIED: ICD-10-CM

## 2022-06-23 DIAGNOSIS — N92.6 IRREGULAR MENSTRUATION, UNSPECIFIED: ICD-10-CM

## 2022-06-23 LAB
BILIRUB UR QL STRIP: NORMAL
CLARITY UR: CLEAR
GLUCOSE UR-MCNC: NORMAL
HCG UR QL: 0.2 EU/DL
HCG UR QL: NEGATIVE
HGB UR QL STRIP.AUTO: NORMAL
KETONES UR-MCNC: NORMAL
LEUKOCYTE ESTERASE UR QL STRIP: NORMAL
NITRITE UR QL STRIP: NORMAL
PH UR STRIP: 6
PROT UR STRIP-MCNC: NORMAL
QUALITY CONTROL: YES
SP GR UR STRIP: 1.01

## 2022-06-23 PROCEDURE — 99395 PREV VISIT EST AGE 18-39: CPT | Mod: 25

## 2022-06-23 PROCEDURE — 81003 URINALYSIS AUTO W/O SCOPE: CPT | Mod: QW

## 2022-06-23 PROCEDURE — 81025 URINE PREGNANCY TEST: CPT

## 2022-06-23 PROCEDURE — 99173 VISUAL ACUITY SCREEN: CPT | Mod: 59

## 2022-06-24 ENCOUNTER — NON-APPOINTMENT (OUTPATIENT)
Age: 22
End: 2022-06-24

## 2022-06-25 PROBLEM — N92.6 MISSED MENSES: Status: ACTIVE | Noted: 2022-06-25

## 2022-06-25 PROBLEM — N39.0 ACUTE UTI: Status: RESOLVED | Noted: 2022-06-25 | Resolved: 2022-06-25

## 2022-06-25 PROBLEM — Z00.00 ENCOUNTER FOR PREVENTIVE HEALTH EXAMINATION: Status: ACTIVE | Noted: 2019-05-31

## 2022-06-25 NOTE — DISCUSSION/SUMMARY
[Met privately with the adolescent for part of the office visit?] : Met privately with the adolescent for part of the office visit? Yes [Adolescent demonstrates understanding of his/her conditions and how to take prescribed medications?] : Adolescent demonstrates understanding of his/her conditions and how to take prescribed medications? Yes [Adolescent asks questions during each office  visit and participates in the care plan?] : Adolescent asks questions during each office visit and participates in the care plan? Yes [Adolescent is competent in independently making appointments, filling prescriptions, following up on referrals, and seeking emergency services, as needed?] : Adolescent is competent in independently making appointments, filling prescriptions, following up on referrals, and seeking emergency services, as needed? Yes [Adolescent's caregivers were provided with the opportunity to discuss their concerns about transferring decision making responsibility to the adolescent?] : Adolescent's caregivers were provided with the opportunity to discuss their concerns about transferring decision making responsibility to the adolescent? Yes [Initiated discussion about transfer to an adult healthcare provider?] : Initiated discussion about transfer to an adult healthcare provider? Yes  [FreeTextEntry1] : Continue and/or try to have a balanced diet with all food groups. Brush teeth twice a day with toothbrush. Recommend visit to dentist. Maintain consistent daily routines and sleep schedule. Risky behaviors assessed. School discussed. Try to limit screen time to no more than 2 hours per day. Encourage physical activity.\par Return 1 year for routine well visit check.\par coordination of care reviewed\par 5-2-1-0 reviewed\par cardiac checklist -reviewed\par CRAFFT screening was reviewed and discussed as needed\par UA -clear\par PREGNANCY TEST (-)

## 2022-06-25 NOTE — PHYSICAL EXAM
[Alert] : alert [No Acute Distress] : no acute distress [Normocephalic] : normocephalic [EOMI Bilateral] : EOMI bilateral [Clear tympanic membranes with bony landmarks and light reflex present bilaterally] : clear tympanic membranes with bony landmarks and light reflex present bilaterally  [Pink Nasal Mucosa] : pink nasal mucosa [Nonerythematous Oropharynx] : nonerythematous oropharynx [Supple, full passive range of motion] : supple, full passive range of motion [No Palpable Masses] : no palpable masses [Clear to Auscultation Bilaterally] : clear to auscultation bilaterally [Regular Rate and Rhythm] : regular rate and rhythm [Normal S1, S2 audible] : normal S1, S2 audible [No Murmurs] : no murmurs [Soft] : soft [NonTender] : non tender [Non Distended] : non distended [No Hepatomegaly] : no hepatomegaly [No Splenomegaly] : no splenomegaly [No Abnormal Lymph Nodes Palpated] : no abnormal lymph nodes palpated [Normal Muscle Tone] : normal muscle tone [No Gait Asymmetry] : no gait asymmetry [No pain or deformities with palpation of bone, muscles, joints] : no pain or deformities with palpation of bone, muscles, joints [Straight] : straight [Cranial Nerves Grossly Intact] : cranial nerves grossly intact [No Rash or Lesions] : no rash or lesions [Ole: _____] : Ole [unfilled]

## 2022-06-25 NOTE — HISTORY OF PRESENT ILLNESS
[Up to date] : Up to date [Normal] : normal [Eats meals with family] : eats meals with family [Normal Performance] : normal performance [Sleep Concerns] : no sleep concerns [No] : No cigarette smoke exposure [Yes] : Patient has had sexual intercourse. [Has problems with sleep] : does not have problems with sleep [Gets depressed, anxious, or irritable/has mood swings] : does not get depressed, anxious, or irritable/has mood swings [Has thought about hurting self or considered suicide] : has not thought about hurting self or considered suicide [FreeTextEntry7] : 21 yr well [FreeTextEntry8] : on BC by GYN but did not get menses this month- concern for pregnancy [de-identified] : did well in college- graduated and now working  [de-identified] : patient has a good variety of foods and fluids  [de-identified] : tries to be active liss can- work keeps her active also  [de-identified] : discussed with patient [de-identified] : under care of GYN [FreeTextEntry1] : parent/patient denies- night sweats, night pains,  unexplained weight loss, headache, chest pain, SOB, loss of energy, chronic joint pains\par patient has normal urine output and stooling\par under care of ortho and PT for knee pain due to impingement syndrome of fat pad right knee\par had recent UTI-(few weeks ago) no further symptoms , would like urine checked

## 2022-06-27 ENCOUNTER — APPOINTMENT (OUTPATIENT)
Dept: ORTHOPEDIC SURGERY | Facility: CLINIC | Age: 22
End: 2022-06-27

## 2022-06-27 DIAGNOSIS — S99.919A UNSPECIFIED INJURY OF UNSPECIFIED ANKLE, INITIAL ENCOUNTER: ICD-10-CM

## 2022-06-27 DIAGNOSIS — S93.402A SPRAIN OF UNSPECIFIED LIGAMENT OF LEFT ANKLE, INITIAL ENCOUNTER: ICD-10-CM

## 2022-06-27 DIAGNOSIS — S99.912A UNSPECIFIED INJURY OF LEFT ANKLE, INITIAL ENCOUNTER: ICD-10-CM

## 2022-06-27 PROCEDURE — 99214 OFFICE O/P EST MOD 30 MIN: CPT

## 2022-06-27 NOTE — ADDENDUM
[FreeTextEntry1] : I, Malena Em, acted solely as a scribe for Dr. Jomar Fulton on this date 06/27/2022.\par \par All medical record entries made by the Scribe were at my, Dr. Jomar Fulton, direction and personally dictated by me on 06/27/2022 . I have reviewed the chart and agree that the record accurately reflects my personal performance of the history, physical exam, assessment and plan. I have also personally directed, reviewed, and agreed with the chart.	\par

## 2022-06-27 NOTE — PHYSICAL EXAM
[de-identified] : General: Alert and oriented x3. In no acute distress. Pleasant in nature with a normal affect. No apparent respiratory distress.\par \par Left Ankle Exam\par Skin: Clean, dry, intact\par Inspection: No obvious malalignment, no swelling, no effusion; no lymphadenopathy\par Pulses: 2+ DP/PT pulses\par ROM:10 degrees of dorsiflexion, 40 degrees of plantarflexion, 10 degrees of subtalar motion\par Tenderness: Pain to the lateral gutter. No tenderness over the lateral malleolus, + ATFL pain, no CFL/PTFL pain. No medial malleolus pain, no deltoid ligament pain. No proximal fibular pain. No heel pain.\par Stability: Negative anterior/posterior drawer.\par Strength: 5/5 TA/GS/EHL\par Neuro: In tact to light touch throughout\par Additional tests: Negative Mortons test, Negative syndesmosis squeeze test. [de-identified] : Procedure was performed at the [facility]\par \par EXAM: ANKLE LT 3 VIEWS\par \par \par PROCEDURE DATE: 06/25/2022\par \par \par INTERPRETATION: INDICATION: Arthralgia of left ankle\par \par COMPARISON: None available\par \par FINDING: Three views of the left ankle demonstrates no fracture. Ankle mortise is congruent. There is mild lateral ankle soft tissue swelling.\par \par IMPRESSION:\par No fracture\par \par --- End of Report ---\par \par \par \par \par \par \par YOKO SCHROEDER MD; Attending Radiologist\par This document has been electronically signed. Jun 25 2022 1:53PM

## 2022-06-27 NOTE — DISCUSSION/SUMMARY
[de-identified] : Today I had a lengthy discussion with the patient regarding their left ankle injury. I have addressed all the patient's concerns surrounding the pathology of their condition. XR imaging results were reviewed with the patient. At this time I would like to obtain advanced imaging of the patient's left ankle. An MRI was ordered so I can find out more about the etiology of the patient's condition. The patient should follow up with the office after obtaining the MRI.		\par \par In the interim, I recommend the patient undergo a course of physical therapy for the  left ankle  2-3 times a week for a total of 8-12 weeks. A prescription was given for the physical therapy today. I recommended that the patient utilize an ASO brace. The patient was fitted for the ASO brace in the office today. The patient understood and verbally agreed to the treatment plan. All of their questions were answered and they were satisfied with the visit. The patient should call the office if they have any questions or experience worsening symptoms.

## 2022-06-27 NOTE — HISTORY OF PRESENT ILLNESS
[FreeTextEntry1] : The patient is a 20 yo female who presents with a left ankle injury which she sustained Friday after she twisted her ankle in a hole in the ground.  Went to Urgent care, x-rays negative. She presents today for orthopedic follow-up. Pain scale 5/10. She has been utilizing ice, compression, and Ibuprofen PRN with relied. Her pain concern is swelling and pain to the ankle in the office. She has no numbness or tingling in the ankle or foot.  No other complaints.

## 2022-08-24 ENCOUNTER — APPOINTMENT (OUTPATIENT)
Dept: OBGYN | Facility: CLINIC | Age: 22
End: 2022-08-24
Payer: MEDICAID

## 2022-08-24 VITALS
HEIGHT: 63 IN | DIASTOLIC BLOOD PRESSURE: 76 MMHG | WEIGHT: 158 LBS | SYSTOLIC BLOOD PRESSURE: 112 MMHG | BODY MASS INDEX: 28 KG/M2

## 2022-08-24 DIAGNOSIS — Z01.419 ENCOUNTER FOR GYNECOLOGICAL EXAMINATION (GENERAL) (ROUTINE) W/OUT ABNORMAL FINDINGS: ICD-10-CM

## 2022-08-24 DIAGNOSIS — Z32.02 ENCOUNTER FOR PREGNANCY TEST, RESULT NEGATIVE: ICD-10-CM

## 2022-08-24 LAB
HCG UR QL: NEGATIVE
QUALITY CONTROL: YES

## 2022-08-24 PROCEDURE — 99213 OFFICE O/P EST LOW 20 MIN: CPT | Mod: 25

## 2022-08-24 PROCEDURE — 99395 PREV VISIT EST AGE 18-39: CPT

## 2022-08-24 PROCEDURE — 81025 URINE PREGNANCY TEST: CPT

## 2022-08-24 NOTE — DISCUSSION/SUMMARY
[FreeTextEntry1] : 22 YO PT PRESENTS FOR [employer]. NO COMPLAINTS OR CONCERNS AT THIS TIME. \par UCG- NEGATIVE \par \par CBE WNL, SBE TEACHING CONDUCTED WITH RETURN DEMONSTRATION\par \par PAP SMEAR COLLECTED WITHOUT COMPLICATION- STD TESTING DESIRED \par BIMANUAL EXAM COMPLETED, WNL \par \par CONTRACEPTION AND ACHES DISCUSSED IN GREAT DETAIL\par PATIENT DECIDES ON OCP AT THIS TIME \par RX SENT TO PHARMACY WITH INSTRUCTIONS ON USE \par STD TRANSMISSION AND SAFE SEX D/W PT IN GREAT DETAIL \par ALL QUESTIONS ANSWERED TO PATIENT SATISFACTION \par \par RTO IN 6 MOS FOR OCP OR PRN \par \par

## 2022-08-26 LAB — CYTOLOGY CVX/VAG DOC THIN PREP: NORMAL

## 2023-01-01 ENCOUNTER — NON-APPOINTMENT (OUTPATIENT)
Age: 23
End: 2023-01-01

## 2023-02-05 ENCOUNTER — NON-APPOINTMENT (OUTPATIENT)
Age: 23
End: 2023-02-05

## 2023-03-16 ENCOUNTER — APPOINTMENT (OUTPATIENT)
Dept: OBGYN | Facility: CLINIC | Age: 23
End: 2023-03-16

## 2023-07-05 ENCOUNTER — NON-APPOINTMENT (OUTPATIENT)
Age: 23
End: 2023-07-05

## 2023-07-26 ENCOUNTER — APPOINTMENT (OUTPATIENT)
Dept: OTOLARYNGOLOGY | Facility: CLINIC | Age: 23
End: 2023-07-26
Payer: MEDICAID

## 2023-07-26 VITALS — BODY MASS INDEX: 27.82 KG/M2 | HEIGHT: 63 IN | TEMPERATURE: 96.7 F | WEIGHT: 157 LBS

## 2023-07-26 DIAGNOSIS — J31.0 CHRONIC RHINITIS: ICD-10-CM

## 2023-07-26 DIAGNOSIS — J34.2 DEVIATED NASAL SEPTUM: ICD-10-CM

## 2023-07-26 DIAGNOSIS — J32.2 CHRONIC ETHMOIDAL SINUSITIS: ICD-10-CM

## 2023-07-26 DIAGNOSIS — G50.1 ATYPICAL FACIAL PAIN: ICD-10-CM

## 2023-07-26 PROCEDURE — 31231 NASAL ENDOSCOPY DX: CPT

## 2023-07-26 PROCEDURE — 99204 OFFICE O/P NEW MOD 45 MIN: CPT | Mod: 25

## 2023-07-26 NOTE — HISTORY OF PRESENT ILLNESS
[de-identified] : co frequent sinusitis\par last episode 6 weeks ago rx azithromycin\par facial pressure nasal congestion w infection and pnd ?colored\par 3 or more per year\par fluticasone but causing nose bleeds\par saline rinses\par headaches forehead and temple\par no seasonal allergies, neg environment irritants

## 2023-07-26 NOTE — REVIEW OF SYSTEMS
[Nasal Congestion] : nasal congestion [Sinus Pressure] : sinus pressure [Negative] : Heme/Lymph [Patient Intake Form Reviewed] : Patient intake form was reviewed

## 2023-07-26 NOTE — ASSESSMENT
[FreeTextEntry1] : intermittent sinusitis managed well w antibioitcs prn\par rhinitis\par continue flonase\par consider ct if frequency of sinusitis increases

## 2023-07-26 NOTE — CONSULT LETTER
[Consult Letter:] : I had the pleasure of evaluating your patient, [unfilled]. [Please see my note below.] : Please see my note below. [Consult Closing:] : Thank you very much for allowing me to participate in the care of this patient.  If you have any questions, please do not hesitate to contact me. [Sincerely,] : Sincerely, [FreeTextEntry1] : Dear Dr. Lashae Landa\par \par Thank you for your kind referral. Please refer to my enclosed office notes for VERENICE GARCIA . If there are any questions free to contact me.\par  [FreeTextEntry3] : Shane Pena MD, FACS\par

## 2023-07-26 NOTE — PROCEDURE
[FreeTextEntry6] : Indication for procedure:  Unable to adequately examine mid and posterior nasal cavity with anterior rhinoscopy\par The patient has recurrent sinusitis\par \par Sinus endoscope # 44\par Nasal septum exam shows septal deviation to the rt at valve 2 plus lleft inferior deviation 1 plus\par The inferior nasal turbinates are moderately hypertrophic in size bilaterally.\par The sinus endoscope was introduced into the right nares\par exam right middle meatus reveals no mucopus or inflammation.  The right middle turbinate is WNL.\par The scope was advanced and the sphenoethmoid region was inspected.\par The superior meatus, superior turbinate and nasal vault are unremarkable.  The nasopharynx is unremarkable without inflammation or mass.\par The sinus endoscope was introduced into the left nares and\par exam left middle meatus reveals no mucopus or inflammation.  The left middle turbinate is WNL.\par The scope was advanced and the sphenoethmoid region was inspected.\par The left superior meatus and nasal vault are unremarkable.\par

## 2023-08-29 ENCOUNTER — APPOINTMENT (OUTPATIENT)
Dept: OBGYN | Facility: CLINIC | Age: 23
End: 2023-08-29
Payer: MEDICAID

## 2023-08-29 VITALS
HEIGHT: 63 IN | SYSTOLIC BLOOD PRESSURE: 110 MMHG | WEIGHT: 162 LBS | BODY MASS INDEX: 28.7 KG/M2 | DIASTOLIC BLOOD PRESSURE: 70 MMHG

## 2023-08-29 DIAGNOSIS — N91.1 SECONDARY AMENORRHEA: ICD-10-CM

## 2023-08-29 DIAGNOSIS — Z12.4 ENCOUNTER FOR SCREENING FOR MALIGNANT NEOPLASM OF CERVIX: ICD-10-CM

## 2023-08-29 DIAGNOSIS — Z11.3 ENCOUNTER FOR SCREENING FOR INFECTIONS WITH A PREDOMINANTLY SEXUAL MODE OF TRANSMISSION: ICD-10-CM

## 2023-08-29 DIAGNOSIS — Z30.41 ENCOUNTER FOR SURVEILLANCE OF CONTRACEPTIVE PILLS: ICD-10-CM

## 2023-08-29 DIAGNOSIS — R10.9 UNSPECIFIED ABDOMINAL PAIN: ICD-10-CM

## 2023-08-29 DIAGNOSIS — Z15.02 GENETIC SUSCEPTIBILITY TO MALIGNANT NEOPLASM OF OVARY: ICD-10-CM

## 2023-08-29 DIAGNOSIS — Z01.419 ENCOUNTER FOR GYNECOLOGICAL EXAMINATION (GENERAL) (ROUTINE) W/OUT ABNORMAL FINDINGS: ICD-10-CM

## 2023-08-29 DIAGNOSIS — Z78.9 OTHER SPECIFIED HEALTH STATUS: ICD-10-CM

## 2023-08-29 DIAGNOSIS — Z80.41 FAMILY HISTORY OF MALIGNANT NEOPLASM OF OVARY: ICD-10-CM

## 2023-08-29 LAB
BILIRUB UR QL STRIP: NORMAL
CLARITY UR: CLEAR
GLUCOSE UR-MCNC: NORMAL
HCG UR QL: 0.2 EU/DL
HCG UR QL: NEGATIVE
HCG UR QL: NEGATIVE
HGB UR QL STRIP.AUTO: NORMAL
KETONES UR-MCNC: NORMAL
LEUKOCYTE ESTERASE UR QL STRIP: NORMAL
NITRITE UR QL STRIP: NORMAL
PH UR STRIP: 6
PROT UR STRIP-MCNC: NORMAL
QUALITY CONTROL: YES
SP GR UR STRIP: 1.02

## 2023-08-29 PROCEDURE — 99395 PREV VISIT EST AGE 18-39: CPT

## 2023-08-29 PROCEDURE — 81003 URINALYSIS AUTO W/O SCOPE: CPT | Mod: QW

## 2023-08-29 PROCEDURE — 81025 URINE PREGNANCY TEST: CPT

## 2023-08-29 RX ORDER — TRIAMCINOLONE ACETONIDE 0.25 MG/G
0.03 OINTMENT TOPICAL TWICE DAILY
Qty: 1 | Refills: 0 | Status: DISCONTINUED | COMMUNITY
Start: 2020-03-13 | End: 2023-08-29

## 2023-08-29 NOTE — DISCUSSION/SUMMARY
[FreeTextEntry1] : 1) pap/std cultures performed 2) urine pregnancy test negative 3) urine dip wnl 4) pt with no contraindicaitons for continued OCP usage, refills issued 5) pt strongly advised to discuss with sister circumstances of denied genetic testing coverage. unlikely it would not be covered 2nd to mother's age - discussed with patient and patient also advised that OCPs are protective against ovarian cancer.  REturn to office in one year, sooner prn

## 2023-08-29 NOTE — HISTORY OF PRESENT ILLNESS
[unknown] : Patient is unsure of the date of her LMP [Currently Active] : currently active [Men] : men [TextBox_4] : Mike is a 23 y/o G0 who presents today for an annual exam with request for OCP refills. She reports daily/timely OCP compliance and states she does not have a menses with her current pill. She has no contraindications for continuation. She requests pregnancy test and urine dip 2nd to intermittent stomach cramping.   She has no contraindications for continued OCP usage.  She had 2/3 Gardasil, does not desire to have last.  Her mother passed away when she was in her 40's  2nd to ovarian cancer. pt states sister attempted to get genetic testing but it was not covered.  She is a triplet - one sisten, one brother.. 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 [FreeTextEntry3] : no condom usage

## 2023-08-31 LAB — CYTOLOGY CVX/VAG DOC THIN PREP: NORMAL

## 2023-09-25 ENCOUNTER — RX RENEWAL (OUTPATIENT)
Age: 23
End: 2023-09-25

## 2024-05-14 ENCOUNTER — APPOINTMENT (OUTPATIENT)
Dept: OBGYN | Facility: CLINIC | Age: 24
End: 2024-05-14

## 2024-05-15 RX ORDER — NORETHINDRONE ACETATE AND ETHINYL ESTRADIOL AND FERROUS FUMARATE 1MG-20(21)
1-20 KIT ORAL
Qty: 84 | Refills: 0 | Status: ACTIVE | COMMUNITY
Start: 2020-07-27 | End: 1900-01-01

## 2024-09-05 ENCOUNTER — APPOINTMENT (OUTPATIENT)
Dept: OBGYN | Facility: CLINIC | Age: 24
End: 2024-09-05
Payer: COMMERCIAL

## 2024-09-05 VITALS
DIASTOLIC BLOOD PRESSURE: 74 MMHG | HEIGHT: 63 IN | BODY MASS INDEX: 30.12 KG/M2 | WEIGHT: 170 LBS | SYSTOLIC BLOOD PRESSURE: 120 MMHG

## 2024-09-05 DIAGNOSIS — Z01.419 ENCOUNTER FOR GYNECOLOGICAL EXAMINATION (GENERAL) (ROUTINE) W/OUT ABNORMAL FINDINGS: ICD-10-CM

## 2024-09-05 DIAGNOSIS — Z15.02 GENETIC SUSCEPTIBILITY TO MALIGNANT NEOPLASM OF OVARY: ICD-10-CM

## 2024-09-05 DIAGNOSIS — Z30.41 ENCOUNTER FOR SURVEILLANCE OF CONTRACEPTIVE PILLS: ICD-10-CM

## 2024-09-05 DIAGNOSIS — Z11.3 ENCOUNTER FOR SCREENING FOR INFECTIONS WITH A PREDOMINANTLY SEXUAL MODE OF TRANSMISSION: ICD-10-CM

## 2024-09-05 DIAGNOSIS — Z12.4 ENCOUNTER FOR SCREENING FOR MALIGNANT NEOPLASM OF CERVIX: ICD-10-CM

## 2024-09-05 PROCEDURE — 99395 PREV VISIT EST AGE 18-39: CPT

## 2024-09-05 NOTE — HISTORY OF PRESENT ILLNESS
[unknown] : Patient is unsure of the date of her LMP [Currently Active] : currently active [Men] : men [No] : No [TextBox_4] : Mike is a 22 y/o G0 who presents today for an annual exam with request for OCP refill. She does not have a menses with this OCP.  She has had 2/3 Gardasil vaccines, declines 3rd  MOther  from ovarian cancer in 40s. pt is a triplet - one sister, one brother.  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 She is going to [employer]. [FreeTextEntry3] : OCPs

## 2024-09-05 NOTE — PHYSICAL EXAM
[Appropriately responsive] : appropriately responsive [Alert] : alert [No Acute Distress] : no acute distress [No Lymphadenopathy] : no lymphadenopathy [Oriented x3] : oriented x3 [Examination Of The Breasts] : a normal appearance [No Discharge] : no discharge [No Masses] : no breast masses were palpable [Labia Majora] : normal [Labia Minora] : normal [Normal] : normal [Uterine Adnexae] : normal [FreeTextEntry4] : thin white vaginal discharge

## 2024-09-05 NOTE — DISCUSSION/SUMMARY
[FreeTextEntry1] : 1) pap/std cultures performed 2) pt with no contraindications for continued OCP usage, refills issued 3) pt advised to return to office for genetic testing  Will f/u pending any abnormal results.

## 2024-09-09 LAB — CYTOLOGY CVX/VAG DOC THIN PREP: NORMAL

## 2024-10-10 ENCOUNTER — APPOINTMENT (OUTPATIENT)
Dept: OBGYN | Facility: CLINIC | Age: 24
End: 2024-10-10

## 2024-10-10 VITALS
DIASTOLIC BLOOD PRESSURE: 80 MMHG | HEIGHT: 63 IN | BODY MASS INDEX: 29.95 KG/M2 | WEIGHT: 169 LBS | SYSTOLIC BLOOD PRESSURE: 120 MMHG

## 2024-10-10 DIAGNOSIS — Z15.02 GENETIC SUSCEPTIBILITY TO MALIGNANT NEOPLASM OF OVARY: ICD-10-CM

## 2024-10-10 PROCEDURE — 99213 OFFICE O/P EST LOW 20 MIN: CPT

## 2024-10-28 ENCOUNTER — NON-APPOINTMENT (OUTPATIENT)
Age: 24
End: 2024-10-28

## 2024-10-29 PROBLEM — Z15.01 ATM GENE MUTATION POSITIVE: Status: ACTIVE | Noted: 2024-10-29

## 2025-06-10 ENCOUNTER — NON-APPOINTMENT (OUTPATIENT)
Age: 25
End: 2025-06-10

## 2025-07-13 ENCOUNTER — EMERGENCY (EMERGENCY)
Facility: HOSPITAL | Age: 25
LOS: 0 days | Discharge: HOME | End: 2025-07-13
Attending: EMERGENCY MEDICINE
Payer: COMMERCIAL

## 2025-07-13 VITALS
DIASTOLIC BLOOD PRESSURE: 88 MMHG | TEMPERATURE: 98 F | HEART RATE: 105 BPM | WEIGHT: 184.31 LBS | RESPIRATION RATE: 18 BRPM | SYSTOLIC BLOOD PRESSURE: 157 MMHG | OXYGEN SATURATION: 100 %

## 2025-07-13 DIAGNOSIS — Z88.2 ALLERGY STATUS TO SULFONAMIDES: ICD-10-CM
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
## 2025-07-13 DIAGNOSIS — S06.0X0A CONCUSSION WITHOUT LOSS OF CONSCIOUSNESS, INITIAL ENCOUNTER: ICD-10-CM
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
## 2025-07-13 PROCEDURE — 70486 CT MAXILLOFACIAL W/O DYE: CPT

## 2025-07-13 PROCEDURE — 70450 CT HEAD/BRAIN W/O DYE: CPT | Mod: 26

## 2025-07-13 PROCEDURE — 99284 EMERGENCY DEPT VISIT MOD MDM: CPT | Mod: 25

## 2025-07-13 PROCEDURE — 99284 EMERGENCY DEPT VISIT MOD MDM: CPT

## 2025-07-13 PROCEDURE — 70450 CT HEAD/BRAIN W/O DYE: CPT

## 2025-07-13 PROCEDURE — 76376 3D RENDER W/INTRP POSTPROCES: CPT

## 2025-07-13 PROCEDURE — 76376 3D RENDER W/INTRP POSTPROCES: CPT | Mod: 26

## 2025-07-13 PROCEDURE — 70486 CT MAXILLOFACIAL W/O DYE: CPT | Mod: 26

## 2025-07-13 RX ORDER — IBUPROFEN 200 MG
600 TABLET ORAL ONCE
Refills: 0 | Status: COMPLETED | OUTPATIENT
Start: 2025-07-13 | End: 2025-07-13

## 2025-07-13 RX ADMIN — Medication 600 MILLIGRAM(S): at 18:56

## 2025-07-13 NOTE — ED ADULT TRIAGE NOTE - CHIEF COMPLAINT QUOTE
pt presents to the ED s/p headstrike on concrete. endorses head and jaw pain. denies LOC. denies AC use. pt A&Ox4, well appearing, and ambulatory at baseline with no further complaints or discomforts reported at this time. NKDA  ice provided in triage

## 2025-07-13 NOTE — ED STATDOCS - CLINICAL SUMMARY MEDICAL DECISION MAKING FREE TEXT BOX
23 y/o female with no pertinent HX presents to ED s/p fall with positive head strike and negative LOC, will get CT of head.

## 2025-07-13 NOTE — ED STATDOCS - PROGRESS NOTE DETAILS
CTs reviewed with patient and family. No actionable findings. Likely concussion. Will dc and provide concussion clinic referral. Advised motrin and tylenol as needed. - Maycol Lopez PA-C 25 y/o F with no PMH presents with head injury after falling from pull up bar. Pt estimates bar was apx 8ft in air. Admits she landed on her buttocks and then her head hit the ground. Denies LOC. Reports fall was on concrete. No reported visual changes, nausea, vomiting. PE: Well appearing. Cardiac: s1s2, RRR> Lungs: CTAB. HEENT: NC/AT. PERRLA, EOMI. Neuro: CN ii-xii intact. Sensation intact to light touch in lower extremities. Patient ambulatory. AP: r/o ICH, plan for CT reassess. - Maycol Lopez PA-C

## 2025-07-13 NOTE — ED STATDOCS - ATTENDING APP SHARED VISIT CONTRIBUTION OF CARE
I, Dr. Michell Tai DO, personally saw the patient with DEMETRIUS.  I have personally performed a face to face diagnostic evaluation on this patient.  I have reviewed the DEMETRIUS note and agree with the history, exam, and plan of care, except as noted.  I personally saw the patient and performed a substantive portion of the visit including all aspects of the medical decision making.  SAM Palmer DO

## 2025-07-13 NOTE — ED STATDOCS - NSFOLLOWUPINSTRUCTIONS_ED_ALL_ED_FT
Concussion    WHAT YOU NEED TO KNOW:    A concussion is a mild brain injury. It is usually caused by a bump or blow to the head from a fall, a motor vehicle crash, or a sports injury. Sometimes being shaken forcefully may cause a concussion.    DISCHARGE INSTRUCTIONS:    Have someone call 911 for any of the following:     Someone tries to wake you and cannot do so.      You have a seizure, increasing confusion, or a change in personality.      Your speech becomes slurred, or you have new vision problems.    Return to the emergency department if:     You have sudden and new vision problems.      You have a severe headache that does not go away.      You have arm or leg weakness, numbness, or new problems with coordination.      You have blood or clear fluid coming out of the ears or nose.    Contact your healthcare provider if:     You have nausea or are vomiting.      You feel more sleepy than usual.      Your symptoms get worse.      Your symptoms last longer than 6 weeks after the injury.      You have questions or concerns about your condition or care.    Medicines: You may need any of the following:     Acetaminophen decreases pain and fever. It is available without a doctor's order. Ask how much to take and how often to take it. Follow directions. Read the labels of all other medicines you are using to see if they also contain acetaminophen, or ask your doctor or pharmacist. Acetaminophen can cause liver damage if not taken correctly. Do not use more than 4 grams (4,000 milligrams) total of acetaminophen in one day.       NSAIDs help decrease swelling and pain or fever. This medicine is available with or without a doctor's order. NSAIDs can cause stomach bleeding or kidney problems in certain people. If you take blood thinner medicine, always ask your healthcare provider if NSAIDs are safe for you. Always read the medicine label and follow directions.      Take your medicine as directed. Contact your healthcare provider if you think your medicine is not helping or if you have side effects. Tell him or her if you are allergic to any medicine. Keep a list of the medicines, vitamins, and herbs you take. Include the amounts, and when and why you take them. Bring the list or the pill bottles to follow-up visits. Carry your medicine list with you in case of an emergency.    Self-care: Concussion symptoms usually go away within about 10 days, but they may last longer. The following may be recommended to manage your symptoms:     Rest from physical and mental activities as directed. Mental activities are those that require thinking, concentration, and attention. You will need to rest until your symptoms are gone. Rest will allow you to recover from your concussion. Ask your healthcare provider when you can return to work and other daily activities.      Have someone stay with you for the first 24 hours after your injury. Your healthcare provider should be contacted if your symptoms get worse, or you develop new symptoms.      Do not participate in sports and physical activities until your healthcare provider says it is okay. They could make your symptoms worse or lead to another concussion. Your healthcare provider will tell you when it is okay for you to return to sports or physical activities. Ask for more information about sports concussions.    Prevent another concussion:     Wear protective sports equipment that fits properly. Helmets help decrease your risk for a serious brain injury. Talk to your healthcare provider about ways you can decrease your risk for a concussion if you play sports.      Wear your seatbelt every time you travel. This helps to decrease your risk for a head injury if you are in a car accident.     Follow up with your healthcare provider as directed: Write down your questions so you remember to ask them during your visits.     FOLLOW UP EVALUATION  To ensure optimal concussion recovery, follow up with a doctor specialized in concussion management. An evaluation by a specialty concussion program can ensure timely return to activities.    We offer appointments through the [facility]’s Hotline at [phone].

## 2025-07-13 NOTE — ED STATDOCS - PATIENT PORTAL LINK FT
You can access the FollowMyHealth Patient Portal offered by [facility] by registering at the following website: [email or web address]. By joining [employer]’s FollowMyHealth portal, you will also be able to view your health information using other applications (apps) compatible with our system.

## 2025-07-13 NOTE — ED ADULT NURSE NOTE - NSFALLUNIVINTERV_ED_ALL_ED
Bed/Stretcher in lowest position, wheels locked, appropriate side rails in place/Call bell, personal items and telephone in reach/Instruct patient to call for assistance before getting out of bed/chair/stretcher/Non-slip footwear applied when patient is off stretcher/[place] to call system/Physically safe environment - no spills, clutter or unnecessary equipment/Purposeful proactive rounding/Room/bathroom lighting operational, light cord in reach

## 2025-07-13 NOTE — ED STATDOCS - PHYSICAL EXAMINATION
Gen:  Well appearing in NAD  Head:  NC/AT  HEENT: pupils perrl,no pharyngeal erythema, uvula midline  Cardiac: S1S2, RRR  Abd: Soft, non tender  Resp: No distress, CTA   musculoskeletal:: no deformities, no swelling, strength +5/+5  Skin: warm and dry as visualized, no rashes  Neuro: BURDEN, aao x 4  Psych:alert, cooperative, appropriate mood and affect for situation

## 2025-07-13 NOTE — ED ADULT NURSE NOTE - OBJECTIVE STATEMENT
24 y F c/o head injury.   General: Well appearing, NAD  Nuro: WDL  Skin: WDL    Head & Neck: headache and neck soreness. .   CARD: currently denies pain.   Respiratory: currently denies SOB, CHOI.   GI: WDL  : WDL  Musculoskeletal: WDL  Safety: maintained,

## 2025-07-13 NOTE — ED STATDOCS - OBJECTIVE STATEMENT
23 y/o female with no pertinent HX presents to ED s/p falling off of pullup bar and hitting back of head. Denies LOC. States she landed on butt and then the back of her head gave backwards causing head strike. Fall was on concrete floor outside.

## 2025-09-10 ENCOUNTER — NON-APPOINTMENT (OUTPATIENT)
Age: 25
End: 2025-09-10

## 2025-09-11 ENCOUNTER — NON-APPOINTMENT (OUTPATIENT)
Age: 25
End: 2025-09-11

## 2025-09-11 ENCOUNTER — APPOINTMENT (OUTPATIENT)
Dept: OBGYN | Facility: CLINIC | Age: 25
End: 2025-09-11

## 2025-09-11 VITALS
DIASTOLIC BLOOD PRESSURE: 80 MMHG | SYSTOLIC BLOOD PRESSURE: 134 MMHG | BODY MASS INDEX: 31.71 KG/M2 | HEIGHT: 63 IN | WEIGHT: 179 LBS

## 2025-09-11 DIAGNOSIS — Z11.3 ENCOUNTER FOR SCREENING FOR INFECTIONS WITH A PREDOMINANTLY SEXUAL MODE OF TRANSMISSION: ICD-10-CM

## 2025-09-11 DIAGNOSIS — Z32.02 ENCOUNTER FOR PREGNANCY TEST, RESULT NEGATIVE: ICD-10-CM

## 2025-09-11 DIAGNOSIS — Z30.41 ENCOUNTER FOR SURVEILLANCE OF CONTRACEPTIVE PILLS: ICD-10-CM

## 2025-09-11 DIAGNOSIS — Z01.411 ENCOUNTER FOR GYNECOLOGICAL EXAMINATION (GENERAL) (ROUTINE) WITH ABNORMAL FINDINGS: ICD-10-CM

## 2025-09-11 DIAGNOSIS — R10.813 RIGHT LOWER QUADRANT ABDOMINAL TENDERNESS: ICD-10-CM

## 2025-09-11 DIAGNOSIS — R10.9 UNSPECIFIED ABDOMINAL PAIN: ICD-10-CM

## 2025-09-11 DIAGNOSIS — Z12.4 ENCOUNTER FOR SCREENING FOR MALIGNANT NEOPLASM OF CERVIX: ICD-10-CM

## 2025-09-11 DIAGNOSIS — L65.9 NONSCARRING HAIR LOSS, UNSPECIFIED: ICD-10-CM

## 2025-09-11 LAB
APPEARANCE: CLEAR
BILIRUBIN URINE: NEGATIVE
BLOOD URINE: NEGATIVE
COLOR: YELLOW
GLUCOSE QUALITATIVE U: NEGATIVE
HCG UR QL: NEGATIVE
KETONES URINE: NEGATIVE
LEUKOCYTE ESTERASE URINE: NEGATIVE
NITRITE URINE: NEGATIVE
PH URINE: 5.5
PROTEIN URINE: NEGATIVE
QUALITY CONTROL: YES
SPECIFIC GRAVITY URINE: 1.02
UROBILINOGEN URINE: 0.2 (ref 0.2–?)

## 2025-09-11 PROCEDURE — 99395 PREV VISIT EST AGE 18-39: CPT

## 2025-09-11 PROCEDURE — 81025 URINE PREGNANCY TEST: CPT

## 2025-09-14 LAB
TESTOST FREE SERPL-MCNC: 2.8 PG/ML
TESTOST SERPL-MCNC: 35.1 NG/DL

## 2025-09-15 LAB — CYTOLOGY CVX/VAG DOC THIN PREP: NORMAL
